# Patient Record
Sex: MALE | Race: WHITE | Employment: UNEMPLOYED | URBAN - METROPOLITAN AREA
[De-identification: names, ages, dates, MRNs, and addresses within clinical notes are randomized per-mention and may not be internally consistent; named-entity substitution may affect disease eponyms.]

---

## 2021-05-18 ENCOUNTER — HOSPITAL ENCOUNTER (EMERGENCY)
Age: 41
Discharge: HOME | End: 2021-05-18
Attending: EMERGENCY MEDICINE

## 2021-05-18 VITALS
SYSTOLIC BLOOD PRESSURE: 133 MMHG | HEART RATE: 79 BPM | BODY MASS INDEX: 25.96 KG/M2 | OXYGEN SATURATION: 99 % | TEMPERATURE: 97.7 F | DIASTOLIC BLOOD PRESSURE: 76 MMHG | HEIGHT: 71 IN | RESPIRATION RATE: 20 BRPM | WEIGHT: 185.41 LBS

## 2021-05-18 DIAGNOSIS — F17.200 TOBACCO DEPENDENCE: ICD-10-CM

## 2021-05-18 DIAGNOSIS — R59.0 LAD (LYMPHADENOPATHY) OF RIGHT CERVICAL REGION: ICD-10-CM

## 2021-05-18 DIAGNOSIS — G50.1 ATYPICAL FACE PAIN: Primary | ICD-10-CM

## 2021-05-18 DIAGNOSIS — K08.89 DENTALGIA: ICD-10-CM

## 2021-05-18 PROCEDURE — 74011250637 HC RX REV CODE- 250/637: Performed by: PHYSICIAN ASSISTANT

## 2021-05-18 PROCEDURE — 74011636637 HC RX REV CODE- 636/637: Performed by: PHYSICIAN ASSISTANT

## 2021-05-18 PROCEDURE — 96372 THER/PROPH/DIAG INJ SC/IM: CPT

## 2021-05-18 PROCEDURE — 74011250636 HC RX REV CODE- 250/636: Performed by: PHYSICIAN ASSISTANT

## 2021-05-18 PROCEDURE — 99283 EMERGENCY DEPT VISIT LOW MDM: CPT

## 2021-05-18 RX ORDER — KETOROLAC TROMETHAMINE 30 MG/ML
30 INJECTION, SOLUTION INTRAMUSCULAR; INTRAVENOUS
Status: COMPLETED | OUTPATIENT
Start: 2021-05-18 | End: 2021-05-18

## 2021-05-18 RX ORDER — BUTALBITAL, ACETAMINOPHEN AND CAFFEINE 50; 325; 40 MG/1; MG/1; MG/1
1 TABLET ORAL
Status: COMPLETED | OUTPATIENT
Start: 2021-05-18 | End: 2021-05-18

## 2021-05-18 RX ORDER — CLINDAMYCIN HYDROCHLORIDE 300 MG/1
300 CAPSULE ORAL 3 TIMES DAILY
Qty: 30 CAP | Refills: 0 | Status: SHIPPED | OUTPATIENT
Start: 2021-05-18 | End: 2021-05-28

## 2021-05-18 RX ORDER — CLINDAMYCIN HYDROCHLORIDE 150 MG/1
300 CAPSULE ORAL
Status: COMPLETED | OUTPATIENT
Start: 2021-05-18 | End: 2021-05-18

## 2021-05-18 RX ORDER — OXYCODONE HYDROCHLORIDE 5 MG/1
5 TABLET ORAL
Qty: 10 TAB | Refills: 0 | Status: SHIPPED | OUTPATIENT
Start: 2021-05-18 | End: 2021-05-21

## 2021-05-18 RX ORDER — PREDNISONE 20 MG/1
60 TABLET ORAL
Status: COMPLETED | OUTPATIENT
Start: 2021-05-18 | End: 2021-05-18

## 2021-05-18 RX ORDER — PREDNISONE 10 MG/1
TABLET ORAL
Qty: 21 TAB | Refills: 0 | Status: SHIPPED | OUTPATIENT
Start: 2021-05-18 | End: 2021-05-22 | Stop reason: ALTCHOICE

## 2021-05-18 RX ADMIN — BUTALBITAL, ACETAMINOPHEN, AND CAFFEINE 1 TABLET: 50; 325; 40 TABLET ORAL at 23:33

## 2021-05-18 RX ADMIN — CLINDAMYCIN HYDROCHLORIDE 300 MG: 150 CAPSULE ORAL at 22:49

## 2021-05-18 RX ADMIN — KETOROLAC TROMETHAMINE 30 MG: 30 INJECTION, SOLUTION INTRAMUSCULAR at 22:49

## 2021-05-18 RX ADMIN — PREDNISONE 60 MG: 20 TABLET ORAL at 22:49

## 2021-05-19 NOTE — ED PROVIDER NOTES
EMERGENCY DEPARTMENT HISTORY AND PHYSICAL EXAM      Date: 5/18/2021  Patient Name: Pee Maravilla    History of Presenting Illness     Chief Complaint   Patient presents with    Dental Pain     Patient having pain behind his bottom teeth on his R side x1 week, has already had his wisdom teeth out. Pain is causing headaches as well and nothing is helping. History Provided By: Patient    HPI: Pee Maravilla, 45 y.o. male presents ambulatory to the emergency department with complaint of right sided facial pain that he believes may be related to his right upper molar region. He states he had some dental work performed in this area many years ago but has not seen a dentist in some time. He denied any trauma. There is been no drainage or bleeding from his mouth. He has no pain in his throat and denied difficulty with swallowing or breathing. There is been no fever or chills. He is a smoker. He states the pain is unrelenting and describes it as a 10 out of 10. Pt is o/w healthy without cough, congestion, ST, chest pain, N/V/D. There are no other complaints, changes, or physical findings at this time. PCP: None    Current Outpatient Medications   Medication Sig Dispense Refill    clindamycin (CLEOCIN) 300 mg capsule Take 1 Cap by mouth three (3) times daily for 10 days. 30 Cap 0    predniSONE (STERAPRED DS) 10 mg dose pack Take as directed 21 Tab 0    oxyCODONE IR (ROXICODONE) 5 mg immediate release tablet Take 1 Tab by mouth every six (6) hours as needed for Pain for up to 3 days. Max Daily Amount: 20 mg. 10 Tab 0       Past History     Past Medical History:  History reviewed. No pertinent past medical history. Past Surgical History:  No past surgical history on file. Family History:  History reviewed. No pertinent family history.     Social History:  Social History     Tobacco Use    Smoking status: Not on file   Substance Use Topics    Alcohol use: Not on file    Drug use: Not on file Allergies:  No Known Allergies      Review of Systems   Review of Systems   Constitutional: Negative for chills and fever. HENT: Positive for dental problem, facial swelling, sinus pressure and sinus pain. Negative for congestion, rhinorrhea, sore throat, trouble swallowing and voice change. Eyes: Negative for photophobia, pain, redness and visual disturbance. Respiratory: Negative for cough and shortness of breath. Cardiovascular: Negative for chest pain and palpitations. Gastrointestinal: Negative for abdominal pain, diarrhea, nausea and vomiting. Endocrine: Negative for polydipsia, polyphagia and polyuria. Genitourinary: Negative for dysuria and hematuria. Musculoskeletal: Negative for neck pain and neck stiffness. Skin: Negative for color change, pallor, rash and wound. Allergic/Immunologic: Negative for food allergies and immunocompromised state. Neurological: Positive for headaches. Negative for dizziness. Hematological: Negative for adenopathy. Does not bruise/bleed easily. Psychiatric/Behavioral: Negative for agitation and confusion. All other systems reviewed and are negative. Physical Exam   Physical Exam  Vitals signs and nursing note reviewed. Constitutional:       General: He is not in acute distress. Appearance: Normal appearance. He is well-developed and normal weight. He is not ill-appearing, toxic-appearing or diaphoretic. HENT:      Head: Normocephalic and atraumatic. Right Ear: Tympanic membrane, ear canal and external ear normal. There is no impacted cerumen. Left Ear: Tympanic membrane, ear canal and external ear normal. There is no impacted cerumen. Nose: Nose normal. No congestion or rhinorrhea. Mouth/Throat:      Mouth: Mucous membranes are moist.      Pharynx: No oropharyngeal exudate. Comments: Pt with poor overall dental health, multiple dental caries, decayed/missing teeth.   Tender to R upper molar region which was notably decayed. No gingival erythema, edema, exudate, or bleeding noted. No trismus, no stridor, no drooling. Speaking in full sentences. Eyes:      General: No scleral icterus. Right eye: No discharge. Left eye: No discharge. Extraocular Movements: Extraocular movements intact. Conjunctiva/sclera: Conjunctivae normal.      Pupils: Pupils are equal, round, and reactive to light. Neck:      Musculoskeletal: Normal range of motion and neck supple. Thyroid: No thyromegaly. Vascular: No JVD. Trachea: No tracheal deviation. Comments: R anterior cervical LAD noted  Cardiovascular:      Rate and Rhythm: Normal rate and regular rhythm. Pulses: Normal pulses. Heart sounds: Normal heart sounds. Pulmonary:      Effort: Pulmonary effort is normal. No respiratory distress. Breath sounds: Normal breath sounds. No stridor. No wheezing, rhonchi or rales. Abdominal:      Palpations: Abdomen is soft. Tenderness: There is no abdominal tenderness. There is no right CVA tenderness, left CVA tenderness, guarding or rebound. Musculoskeletal: Normal range of motion. General: No deformity. Lymphadenopathy:      Cervical: Cervical adenopathy present. Skin:     General: Skin is warm and dry. Coloration: Skin is not pale. Findings: No bruising, erythema or rash. Neurological:      General: No focal deficit present. Mental Status: He is alert and oriented to person, place, and time. Cranial Nerves: No cranial nerve deficit. Sensory: No sensory deficit. Motor: No weakness or abnormal muscle tone. Coordination: Coordination normal.      Gait: Gait normal.   Psychiatric:         Mood and Affect: Mood normal.         Behavior: Behavior normal.         Judgment: Judgment normal.         Diagnostic Study Results     Labs -   No results found for this or any previous visit (from the past 12 hour(s)).     Radiologic Studies - No orders to display         Medical Decision Making   I am the first provider for this patient. I reviewed the vital signs, available nursing notes, past medical history, past surgical history, family history and social history. Vital Signs-Reviewed the patient's vital signs. Patient Vitals for the past 12 hrs:   Temp Pulse Resp BP SpO2   05/18/21 2158 97.7 °F (36.5 °C) 79 20 133/76 99 %           Records Reviewed: Nursing Notes, Old Medical Records, Previous Radiology Studies and Previous Laboratory Studies    Provider Notes (Medical Decision Making):   Dental caries, dental abscess, LAD, exposed nerve root, salivary stone    ED Course:   Initial assessment performed. The patients presenting problems have been discussed, and they are in agreement with the care plan formulated and outlined with them. I have encouraged them to ask questions as they arise throughout their visit. TOBACCO CESSATION COUNSELING  The patient was counseled on the dangers of tobacco use, and was advised to quit. Reviewed strategies to maximize success, including written materials. Pt was educated on the risks of smoking and the benefits of cessation x 4 min. DISCHARGE NOTE:  The care plan has been outline with the patient and/or family, who verbally conveyed understanding and agreement. Available results have been reviewed. Patient and/or family understand the follow up plan as outlined and discharge instructions. Should their condition deterioration at any time after discharge the patient agrees to return, follow up sooner than outlined or seek medical assistance at the closest Emergency Room as soon as possible. Questions have been answered. Discharge instructions and educational information regarding the patient's diagnosis as well a list of reasons why the patient would want to seek immediate medical attention, should their condition change, were reviewed directly with the patient/family            PLAN:  1.    Discharge Medication List as of 5/18/2021 11:16 PM      START taking these medications    Details   clindamycin (CLEOCIN) 300 mg capsule Take 1 Cap by mouth three (3) times daily for 10 days. , Normal, Disp-30 Cap, R-0      predniSONE (STERAPRED DS) 10 mg dose pack Take as directed, Normal, Disp-21 Tab, R-0      oxyCODONE IR (ROXICODONE) 5 mg immediate release tablet Take 1 Tab by mouth every six (6) hours as needed for Pain for up to 3 days. Max Daily Amount: 20 mg., Normal, Disp-10 Tab, R-0           2. Follow-up Information     Follow up With Specialties Details Why Contact Info    [facility]    [address] [address]  Farhan Pacheco , [facility], [facility] Oral Surgery   [address]  [phone]      [facility] EMERGENCY DEPT Emergency Medicine  If symptoms worsen [address] [place] [address] Drive  [address]  [facility]  [phone]        Return to ED if worse     Diagnosis     Clinical Impression:   1. Atypical face pain    2. Dentalgia    3. Tobacco dependence    4.  LAD (lymphadenopathy) of right cervical region

## 2021-05-19 NOTE — ED NOTES
Bedside and Verbal report given to Marino RN (oncoming nurse) by Darylene Fleet, RN (offgoing nurse). Report included the following information SBAR, ED Summary, MAR and Recent Results.

## 2021-05-19 NOTE — ED NOTES
Nancy LINDSEY reviewed discharge instructions with the patient. The patient verbalized understanding. All questions and concerns were addressed. The patient is discharged ambulatory with instructions and prescriptions in hand. Pt is alert and oriented x 4. Respirations are clear and unlabored.

## 2021-05-19 NOTE — DISCHARGE INSTRUCTIONS
Complete all antibiotics as prescribed. Avoid tobacco. Follow up with Oral Maxillofacial surgery at your earliest opportunity.

## 2021-05-22 ENCOUNTER — HOSPITAL ENCOUNTER (EMERGENCY)
Age: 41
Discharge: HOME | End: 2021-05-22
Attending: EMERGENCY MEDICINE

## 2021-05-22 VITALS
OXYGEN SATURATION: 100 % | RESPIRATION RATE: 16 BRPM | HEIGHT: 71 IN | DIASTOLIC BLOOD PRESSURE: 83 MMHG | BODY MASS INDEX: 25.56 KG/M2 | TEMPERATURE: 97.7 F | SYSTOLIC BLOOD PRESSURE: 134 MMHG | HEART RATE: 73 BPM | WEIGHT: 182.54 LBS

## 2021-05-22 DIAGNOSIS — K08.89 PAIN, DENTAL: Primary | ICD-10-CM

## 2021-05-22 PROCEDURE — 74011000250 HC RX REV CODE- 250: Performed by: EMERGENCY MEDICINE

## 2021-05-22 PROCEDURE — 99283 EMERGENCY DEPT VISIT LOW MDM: CPT

## 2021-05-22 PROCEDURE — 74011250637 HC RX REV CODE- 250/637: Performed by: EMERGENCY MEDICINE

## 2021-05-22 RX ORDER — KETOROLAC TROMETHAMINE 10 MG/1
10 TABLET, FILM COATED ORAL
Qty: 20 TABLET | Refills: 0 | Status: SHIPPED | OUTPATIENT
Start: 2021-05-22

## 2021-05-22 RX ORDER — NAPROXEN 500 MG/1
500 TABLET ORAL
Qty: 20 TABLET | Refills: 0 | Status: SHIPPED | OUTPATIENT
Start: 2021-05-22 | End: 2021-05-22 | Stop reason: ALTCHOICE

## 2021-05-22 RX ORDER — PENICILLIN V POTASSIUM 500 MG/1
500 TABLET, FILM COATED ORAL 4 TIMES DAILY
Qty: 28 TABLET | Refills: 0 | Status: SHIPPED | OUTPATIENT
Start: 2021-05-22 | End: 2021-05-29

## 2021-05-22 RX ADMIN — BENZOCAINE, BUTAMBEN, AND TETRACAINE HYDROCHLORIDE: .028; .004; .004 AEROSOL, SPRAY TOPICAL at 08:29

## 2021-05-22 NOTE — DISCHARGE INSTRUCTIONS
Please continue to take the clindamycin antibiotic. It is too early to say that it is not working. Please take the naproxen in addition to the oxycodone that you were prescribed the other day. The anti-inflammatory, naproxen is what you really need to help with the pain. Use the dental balls to help with pain as well. Follow-up with your dentist as soon as possible. Dental resources:    Emergency [address], [facility] [address]   Monday, Wednesday, Friday: 8am-5pm   Tuesday and Thursday: 8am-6pm   Phone: [phone]   $70 for Emergency Care   $60 for first routine care, then pay by sliding scale based upon income     [facility] [address] [place] [address] [address] /David Hernandez [facility]   Phone: [phone], select option (2) to confirm time for treatment     [facility]   [address] [facility] [address]/David Mckeon   Monday-Friday: 8am-4pm   Phone: [phone] of Dentistry Urgent [address] [facility], [address] [address] [address]David Hernandez [address]   Phone: [phone] to confirm a time for emergency treatment   Pediatrics: [phone]   $75 per tooth, extractions only     [facility]   [address], [address] [place] [address]   Phone [phone] or [phone]   Hours [id number] (extractions)   Simple tooth extraction: $60 per tooth, $55 per x-ray     [facility] (in [place])   [facility] only   Phone: [phone], leave message saying you need an appointment to register   Hours: Wed 6-9p       Non-Urgent Asad PÉREZ [address] [facility] at [facility]   Dental Clinic: [phone]   Oral Surgery Clinic: [phone]

## 2021-05-22 NOTE — ED PROVIDER NOTES
EMERGENCY DEPARTMENT HISTORY AND PHYSICAL EXAM      Date: 2021  Patient Name: Amy Reddy  Patient Age and Sex: 45 y.o. male     History of Presenting Illness     Chief Complaint   Patient presents with    Dental Problem     pain in right jaw ongoing it went away after medication prescribed; pain severe 3 or 4 days ago. treated with antibiotics which he is still taking; he is due to go to dentist on monday    Jaw Pain       History Provided By: Patient    HPI: Amy Reddy is a 52-year-old male with a history of smoking presenting with dental pain. Patient states that for the past week and a half has been having right upper dental pain. States that he was here on May 18 and prescribed clindamycin as well as oxycodone but continues to have the pain and now also having facial swelling with some pain radiating to his jaw and into his sinuses. Patient states he has not yet made an appointment with dentistry. Patient denies any fevers. There are no other complaints, changes, or physical findings at this time. PCP: None    No current facility-administered medications on file prior to encounter. Current Outpatient Medications on File Prior to Encounter   Medication Sig Dispense Refill    clindamycin (CLEOCIN) 300 mg capsule Take 1 Cap by mouth three (3) times daily for 10 days. 30 Cap 0    [] oxyCODONE IR (ROXICODONE) 5 mg immediate release tablet Take 1 Tab by mouth every six (6) hours as needed for Pain for up to 3 days. Max Daily Amount: 20 mg. 10 Tab 0    [DISCONTINUED] predniSONE (STERAPRED DS) 10 mg dose pack Take as directed 21 Tab 0       Past History     Past Medical History:  No past medical history on file. Past Surgical History:  No past surgical history on file. Family History:  No family history on file.     Social History:  Social History     Tobacco Use    Smoking status: Not on file   Substance Use Topics    Alcohol use: Not on file    Drug use: Not on file Allergies:  No Known Allergies      Review of Systems   Review of Systems   Constitutional: Negative for chills and fever. HENT: Positive for dental problem and facial swelling. Respiratory: Negative for cough and shortness of breath. Cardiovascular: Negative for chest pain. Gastrointestinal: Negative for abdominal pain, constipation, diarrhea, nausea and vomiting. Genitourinary: Negative for dysuria, frequency and hematuria. Neurological: Negative for weakness and numbness. All other systems reviewed and are negative. Physical Exam   Physical Exam  Vitals and nursing note reviewed. Constitutional:       Appearance: He is well-developed. HENT:      Head: Normocephalic and atraumatic. Nose: Nose normal.      Mouth/Throat:      Mouth: Mucous membranes are moist.      Comments: Patient has poor dentition's due to the smoking. Tender to palpation over his right upper molars with gingival swelling and facial swelling. Able to open his jaw without any problems. No parotid involvement. Eyes:      Extraocular Movements: Extraocular movements intact. Conjunctiva/sclera: Conjunctivae normal.   Cardiovascular:      Rate and Rhythm: Normal rate and regular rhythm. Pulmonary:      Effort: Pulmonary effort is normal. No respiratory distress. Breath sounds: Normal breath sounds. Abdominal:      General: There is no distension. Palpations: Abdomen is soft. Tenderness: There is no abdominal tenderness. Musculoskeletal:         General: Normal range of motion. Cervical back: Normal range of motion and neck supple. Skin:     General: Skin is warm and dry. Neurological:      General: No focal deficit present. Mental Status: He is alert and oriented to person, place, and time. Mental status is at baseline.    Psychiatric:         Mood and Affect: Mood normal.          Diagnostic Study Results     Labs -   No results found for this or any previous visit (from the past 12 hour(s)). Radiologic Studies -   No orders to display     CT Results  (Last 48 hours)    None        CXR Results  (Last 48 hours)    None            Medical Decision Making   I am the first provider for this patient. I reviewed the vital signs, available nursing notes, past medical history, past surgical history, family history and social history. Vital Signs-Reviewed the patient's vital signs. Patient Vitals for the past 12 hrs:   Temp Pulse Resp BP SpO2   05/22/21 0748 97.7 °F (36.5 °C) 73 16 134/83 100 %       Records Reviewed: Nursing Notes and Old Medical Records    Provider Notes (Medical Decision Making):   Patient presents with dental pain. No obvious abscess that needs drainage. No red flags that make PTA, RPA, ludwigs angina concerning. Will tx with dental ball, antibiotics and outpatient analgesics. Given information on dentists and importance of followup and no smoking. Will add penicillin to the clindamycin given that has been 3 days. We will also add naproxen. Will not be prescribing any more narcotics. Will do dental balls here. Smoking Cessation Counseling  Discussed the risks of smoking tobacco products and the long term sequelae of tobacco use with the patient such as increased risk of heart attack, stroke, peripheral artery disease and cancer. The patient verbalized their understanding and were provided resources if asked. Counseled patient for approximately 3 minutes. ED Course:   Initial assessment performed. The patients presenting problems have been discussed, and they are in agreement with the care plan formulated and outlined with them. I have encouraged them to ask questions as they arise throughout their visit. Critical Care Time:   0    Disposition:  Discharge Note:  The patient has been re-evaluated and is ready for discharge. Reviewed available results with patient. Counseled patient on diagnosis and care plan.  Patient has expressed understanding, and all questions have been answered. Patient agrees with plan and agrees to follow up as recommended, or to return to the ED if their symptoms worsen. Discharge instructions have been provided and explained to the patient, along with reasons to return to the ED. PLAN:  Current Discharge Medication List      START taking these medications    Details   naproxen (NAPROSYN) 500 mg tablet Take 1 Tablet by mouth every twelve (12) hours as needed for Pain. Qty: 20 Tablet, Refills: 0  Start date: 5/22/2021         1.   2.   Follow-up Information     Follow up With Specialties Details Why Contact Info    Dentist  Schedule an appointment as soon as possible for a visit           3. Return to ED if worse     Diagnosis     Clinical Impression:   1. Pain, dental        Attestations:    Charmayne Bow, M.D. Please note that this dictation was completed with [employer], the computer voice recognition software. Quite often unanticipated grammatical, syntax, homophones, and other interpretive errors are inadvertently transcribed by the computer software. Please disregard these errors. Please excuse any errors that have escaped final proofreading. Thank you.

## 2021-05-25 ENCOUNTER — APPOINTMENT (OUTPATIENT)
Dept: GENERAL RADIOLOGY | Age: 41
End: 2021-05-25
Attending: EMERGENCY MEDICINE

## 2021-05-25 ENCOUNTER — HOSPITAL ENCOUNTER (EMERGENCY)
Age: 41
Discharge: HOME | End: 2021-05-25
Attending: EMERGENCY MEDICINE

## 2021-05-25 VITALS
HEIGHT: 71 IN | DIASTOLIC BLOOD PRESSURE: 79 MMHG | HEART RATE: 98 BPM | WEIGHT: 175 LBS | SYSTOLIC BLOOD PRESSURE: 139 MMHG | OXYGEN SATURATION: 99 % | TEMPERATURE: 98.7 F | BODY MASS INDEX: 24.5 KG/M2 | RESPIRATION RATE: 18 BRPM

## 2021-05-25 DIAGNOSIS — S90.32XA CONTUSION OF LEFT FOOT, INITIAL ENCOUNTER: Primary | ICD-10-CM

## 2021-05-25 DIAGNOSIS — S91.312A LACERATION OF LEFT FOOT, INITIAL ENCOUNTER: ICD-10-CM

## 2021-05-25 PROCEDURE — 75810000293 HC SIMP/SUPERF WND  RPR

## 2021-05-25 PROCEDURE — 73650 X-RAY EXAM OF HEEL: CPT

## 2021-05-25 PROCEDURE — 74011250636 HC RX REV CODE- 250/636: Performed by: EMERGENCY MEDICINE

## 2021-05-25 PROCEDURE — 99283 EMERGENCY DEPT VISIT LOW MDM: CPT

## 2021-05-25 PROCEDURE — 73630 X-RAY EXAM OF FOOT: CPT

## 2021-05-25 PROCEDURE — 73610 X-RAY EXAM OF ANKLE: CPT

## 2021-05-25 PROCEDURE — 90715 TDAP VACCINE 7 YRS/> IM: CPT | Performed by: EMERGENCY MEDICINE

## 2021-05-25 PROCEDURE — 90471 IMMUNIZATION ADMIN: CPT

## 2021-05-25 PROCEDURE — 74011000250 HC RX REV CODE- 250

## 2021-05-25 RX ORDER — NAPROXEN 500 MG/1
500 TABLET ORAL 2 TIMES DAILY WITH MEALS
Qty: 20 TABLET | Refills: 0 | Status: SHIPPED | OUTPATIENT
Start: 2021-05-25 | End: 2021-06-04

## 2021-05-25 RX ORDER — HYDROCODONE BITARTRATE AND ACETAMINOPHEN 5; 325 MG/1; MG/1
1 TABLET ORAL
Qty: 10 TABLET | Refills: 0 | Status: SHIPPED | OUTPATIENT
Start: 2021-05-25 | End: 2021-05-28

## 2021-05-25 RX ORDER — LIDOCAINE HYDROCHLORIDE 20 MG/ML
5 INJECTION, SOLUTION EPIDURAL; INFILTRATION; INTRACAUDAL; PERINEURAL ONCE
Status: COMPLETED | OUTPATIENT
Start: 2021-05-25 | End: 2021-05-25

## 2021-05-25 RX ORDER — LIDOCAINE HYDROCHLORIDE 20 MG/ML
INJECTION, SOLUTION EPIDURAL; INFILTRATION; INTRACAUDAL; PERINEURAL
Status: COMPLETED
Start: 2021-05-25 | End: 2021-05-25

## 2021-05-25 RX ADMIN — TETANUS TOXOID, REDUCED DIPHTHERIA TOXOID AND ACELLULAR PERTUSSIS VACCINE, ADSORBED 0.5 ML: 5; 2.5; 8; 8; 2.5 SUSPENSION INTRAMUSCULAR at 07:20

## 2021-05-25 RX ADMIN — LIDOCAINE HYDROCHLORIDE 100 MG: 20 INJECTION, SOLUTION EPIDURAL; INFILTRATION; INTRACAUDAL; PERINEURAL at 07:19

## 2021-05-25 NOTE — Clinical Note
[address] 
[facility] EMERGENCY DEPT 
[address] [place] [id number] [phone] Work/School Note Date: 5/25/2021 To Whom It May concern: 
 
Saurabh Riggins was seen and treated today in the emergency room by the following provider(s): 
Attending Provider: Evy Morales MD.   
 
Saurabh Riggins is excused from work/school on 05/25/21 and 05/26/21. He is medically clear to return to work/school on 5/27/2021. Sincerely, Kashif Noguera MD

## 2021-05-25 NOTE — Clinical Note
[address] 
[facility] EMERGENCY DEPT 
[address] [place] [id number] [phone] Work/School Note Date: 5/25/2021 To Whom It May concern: 
 
Charlie Hoang was seen and treated today in the emergency room by the following provider(s): 
Attending Provider: Jay Hines MD.   
 
Charlie Hoang is excused from work/school on 05/25/21 and 05/26/21. He is medically clear to return to work/school on 5/27/2021. Sincerely, Nova Pederson MD

## 2021-05-25 NOTE — Clinical Note
[address] 
[facility] EMERGENCY DEPT 
[address] [facility] [place] [id number] [phone] Work/School Note Date: 5/25/2021 To Whom It May concern: 
 
Machelle Gregg was seen and treated today in the emergency room by the following provider(s): 
Attending Provider: Nader Ching MD.   
 
Machelle Gregg is excused from work/school on 05/25/21 and 05/26/21. He is medically clear to return to work/school on 5/27/2021. Sincerely, Kimberly Crockett MD

## 2021-05-25 NOTE — Clinical Note
[address] 
[facility] EMERGENCY DEPT 
[address] [place] [id number] [phone] Work/School Note Date: 5/25/2021 To Whom It May concern: 
 
Ross Falcon was seen and treated today in the emergency room by the following provider(s): 
Attending Provider: Abby Lange MD.   
 
Ross Falcon is excused from work/school on 5/25/2021 through 5/28/2021. He is medically clear to return to work/school on 5/29/2021. Sincerely, Sloan Alvarado MD

## 2021-05-25 NOTE — Clinical Note
[facility] EMERGENCY DEPT 
[address] [facility] [id number] [phone] Work/School Note Date: 5/25/2021 To Whom It May concern: 
 
Ross Falcon was seen and treated today in the emergency room by the following provider(s): 
Attending Provider: Abby Lange MD.   
 
Ross Falcon is excused from work/school on 5/25/2021 through 5/28/2021. He is medically clear to return to work/school on 5/29/2021. Sincerely, Sloan Alvarado MD

## 2021-05-25 NOTE — Clinical Note
[facility] EMERGENCY DEPT 
[address] [facility] [place] [address] 
[phone] Work/School Note Date: 5/25/2021 To Whom It May concern: 
 
Andrea Castle was seen and treated today in the emergency room by the following provider(s): 
Attending Provider: Francisca Torres MD.   
 
Andrea Castle is excused from work/school on 05/25/21 and 05/26/21. He is medically clear to return to work/school on 5/27/2021. Sincerely, Francheska Moon MD

## 2021-05-25 NOTE — ED NOTES
Pt discharged by Rhett. Pt provided with discharge instructions RX and instructions on follow up care. Pt out of ED via wheelchair accompanied by RN.
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
## 2021-12-03 ENCOUNTER — HOSPITAL ENCOUNTER (EMERGENCY)
Age: 41
Discharge: HOME | End: 2021-12-03
Attending: STUDENT IN AN ORGANIZED HEALTH CARE EDUCATION/TRAINING PROGRAM

## 2021-12-03 VITALS
BODY MASS INDEX: 24.5 KG/M2 | HEIGHT: 71 IN | OXYGEN SATURATION: 100 % | RESPIRATION RATE: 12 BRPM | WEIGHT: 175 LBS | SYSTOLIC BLOOD PRESSURE: 148 MMHG | HEART RATE: 90 BPM | TEMPERATURE: 97.6 F | DIASTOLIC BLOOD PRESSURE: 85 MMHG

## 2021-12-03 DIAGNOSIS — H57.89 REDNESS OF LEFT EYE: Primary | ICD-10-CM

## 2021-12-03 PROCEDURE — 99283 EMERGENCY DEPT VISIT LOW MDM: CPT

## 2021-12-03 RX ORDER — TETRACAINE HYDROCHLORIDE 5 MG/ML
1 SOLUTION OPHTHALMIC
Status: DISCONTINUED | OUTPATIENT
Start: 2021-12-03 | End: 2021-12-03 | Stop reason: HOSPADM

## 2021-12-03 RX ORDER — ERYTHROMYCIN 5 MG/G
OINTMENT OPHTHALMIC
Qty: 3.5 G | Refills: 0 | Status: SHIPPED | OUTPATIENT
Start: 2021-12-03 | End: 2021-12-10

## 2021-12-03 NOTE — ED PROVIDER NOTES
EMERGENCY DEPARTMENT HISTORY AND PHYSICAL EXAM      Date: 12/3/2021  Patient Name: Casandra Grullon    History of Presenting Illness     Chief Complaint   Patient presents with    Eye Pain     pt states redness and swelling x 3 days        History Provided By: Patient    HPI: Casandra Grullon, 37 y.o. smoking male without significant PMHx presents BIB self to the ED with cc of left eye redness for 3 days. Denies pain but admits to \"irritated\" sensation. He states the vision of the left eye is slightly blurry with reading and distance. Admits increased tearing out of that eye. Patient states he woke up with the symptoms. He denies injury, though he does admit to doing yard work the day prior to onset of symptoms. Patient's right eye is unaffected. He states it is possible something got into his eye. He denies contacts or glasses use. He denies foreign body sensation, purulent discharge, photophobia, pruritus, headache, dizziness, nausea/vomiting. He has never seen an eye doctor. There are no other complaints, changes, or physical findings at this time. PCP: None    No current facility-administered medications on file prior to encounter. Current Outpatient Medications on File Prior to Encounter   Medication Sig Dispense Refill    ketorolac (TORADOL) 10 mg tablet Take 1 Tablet by mouth every six (6) hours as needed for Pain. 20 Tablet 0       Past History     Past Medical History:  No past medical history on file. Past Surgical History:  No past surgical history on file. Family History:  No family history on file. Social History:  Social History     Tobacco Use    Smoking status: Not on file    Smokeless tobacco: Not on file   Substance Use Topics    Alcohol use: Not on file    Drug use: Not on file       Allergies:  No Known Allergies      Review of Systems   Review of Systems   Constitutional: Negative for fever. Eyes: Positive for discharge (tearing), redness and visual disturbance. Gastrointestinal: Negative for nausea and vomiting. Neurological: Negative for dizziness and headaches. Physical Exam   Physical Exam  Vitals and nursing note reviewed. Constitutional:       General: He is not in acute distress. Appearance: Normal appearance. He is not toxic-appearing. HENT:      Head: Normocephalic and atraumatic. Nose: Nose normal.      Mouth/Throat:      Mouth: Mucous membranes are moist.   Eyes:      General: Lids are normal. Vision grossly intact. Intraocular pressure: Right eye pressure is 13 mmHg. Left eye pressure is 8 mmHg. Measurements were taken using a handheld tonometer. Extraocular Movements: Extraocular movements intact. Conjunctiva/sclera:      Right eye: No exudate or hemorrhage. Left eye: No exudate or hemorrhage. Pupils: Pupils are equal, round, and reactive to light. Right eye: No corneal abrasion or fluorescein uptake. Leonila exam negative. Left eye: No corneal abrasion or fluorescein uptake. Leonila exam negative. Slit lamp exam:     Right eye: No corneal ulcer, foreign body or photophobia. Left eye: No corneal ulcer, foreign body or photophobia. Comments: Left conjunctiva diffusely injected. There is no ciliary flush. Neck:      Trachea: Phonation normal.   Pulmonary:      Effort: Pulmonary effort is normal.   Musculoskeletal:         General: Normal range of motion. Cervical back: Normal range of motion and neck supple. Skin:     General: Skin is warm and dry. Neurological:      Mental Status: He is alert and oriented to person, place, and time. GCS: GCS eye subscore is 4. GCS verbal subscore is 5. GCS motor subscore is 6. Psychiatric:         Mood and Affect: Mood normal.         Behavior: Behavior normal.         Diagnostic Study Results     Labs -   No results found for this or any previous visit (from the past 12 hour(s)).     Radiologic Studies -   No orders to display     CT Results (Last 48 hours)    None        CXR Results  (Last 48 hours)    None            Medical Decision Making   I am the first provider for this patient. I reviewed the vital signs, available nursing notes, past medical history, past surgical history, family history and social history. Vital Signs-Reviewed the patient's vital signs. Patient Vitals for the past 12 hrs:   Temp Pulse Resp BP SpO2   12/03/21 1012 97.6 °F (36.4 °C) 90 12 (!) 148/85 100 %     Visual Acuity   R 20/80  L 20/125    Records Reviewed: Nursing Notes    Provider Notes (Medical Decision Making):   IOP wnl bilaterally, low suspicion for acute angle-closure glaucoma. Symptoms and exam not suggestive of keratitis or uveitis. Presentation more suggestive of episcleritis or conjunctivitis. We will plan to discharge patient with erythromycin ointment and plans for ophthalmology follow-up. ED return precautions given. Patient is in agreement this plan. ED Course:   Initial assessment performed. The patients presenting problems have been discussed, and they are in agreement with the care plan formulated and outlined with them. I have encouraged them to ask questions as they arise throughout their visit. Critical Care Time: None    Disposition:  D/c    PLAN:  1. Current Discharge Medication List      START taking these medications    Details   erythromycin (ILOTYCIN) ophthalmic ointment Apply small ribbon to the affected eye 4 times a day for 5 days  Qty: 3.5 g, Refills: 0  Start date: 12/3/2021, End date: 12/10/2021           2.    Follow-up Information     Follow up With Specialties Details Why Contact Info    [facility] EMERGENCY DEPT Emergency Medicine  As needed, If symptoms worsen [address]  [address] [facility]  [phone]    Your PCP  Call  For follow up     [facility]  Call  For follow up in 1-2 days [address] [place] [address] [phone]        Return to ED if worse Diagnosis     Clinical Impression:   1. Redness of left eye          Please note that this dictation was completed with [employer], the computer voice recognition software. Quite often unanticipated grammatical, syntax, homophones, and other interpretive errors are inadvertently transcribed by the computer software. Please disregards these errors. Please excuse any errors that have escaped final proofreading.

## 2023-05-22 RX ORDER — KETOROLAC TROMETHAMINE 10 MG/1
10 TABLET, FILM COATED ORAL EVERY 6 HOURS PRN
COMMUNITY
Start: 2021-05-22